# Patient Record
Sex: FEMALE | Race: WHITE | NOT HISPANIC OR LATINO | Employment: OTHER | URBAN - METROPOLITAN AREA
[De-identification: names, ages, dates, MRNs, and addresses within clinical notes are randomized per-mention and may not be internally consistent; named-entity substitution may affect disease eponyms.]

---

## 2018-04-13 LAB
ANION GAP SERPL CALCULATED.3IONS-SCNC: 13 MM/L
APTT PPP: 30.7 SEC (ref 24.4–37.6)
BASOPHILS # BLD AUTO: 0.1 X3/UL (ref 0–0.3)
BASOPHILS # BLD AUTO: 0.7 % (ref 0–2)
BUN SERPL-MCNC: 15 MG/DL (ref 7–25)
CALCIUM SERPL-MCNC: 9.3 MG/DL (ref 8.6–10.5)
CHLORIDE SERPL-SCNC: 105 MM/L (ref 98–107)
CO2 SERPL-SCNC: 27 MM/L (ref 21–31)
CREAT SERPL-MCNC: 0.82 MG/DL (ref 0.6–1.2)
DEPRECATED RDW RBC AUTO: 13.5 % (ref 11.5–14.5)
EGFR (HISTORICAL): > 60 GFR
EGFR AFRICAN AMERICAN (HISTORICAL): > 60 GFR
EOSINOPHIL # BLD AUTO: 0.4 X3/UL (ref 0–0.5)
EOSINOPHIL NFR BLD AUTO: 5.3 % (ref 0–5)
GLUCOSE (HISTORICAL): 112 MG/DL (ref 65–99)
HCT VFR BLD AUTO: 41.2 % (ref 37–47)
HGB BLD-MCNC: 14 G/DL (ref 12–16)
INR PPP: 1.05 (ref 0.9–1.5)
LYMPHOCYTES # BLD AUTO: 2.2 X3/UL (ref 1.2–4.2)
LYMPHOCYTES NFR BLD AUTO: 28.5 % (ref 20.5–51.1)
MCH RBC QN AUTO: 29.1 PG (ref 26–34)
MCHC RBC AUTO-ENTMCNC: 34.1 G/DL (ref 31–36)
MCV RBC AUTO: 85.3 FL (ref 81–99)
MONOCYTES # BLD AUTO: 0.6 X3/UL (ref 0–1)
MONOCYTES NFR BLD AUTO: 7.6 % (ref 1.7–12)
NEUTROPHILS # BLD AUTO: 4.4 X3/UL (ref 1.4–6.5)
NEUTS SEG NFR BLD AUTO: 57.9 % (ref 42.2–75.2)
OSMOLALITY, SERUM (HISTORICAL): 283 MOSM (ref 262–291)
PLATELET # BLD AUTO: 225 X3/UL (ref 130–400)
PMV BLD AUTO: 9.3 FL (ref 8.6–11.7)
POTASSIUM SERPL-SCNC: 4 MM/L (ref 3.5–5.5)
PROTHROMBIN TIME (HISTORICAL): 12.2 SEC (ref 10.1–12.9)
RBC # BLD AUTO: 4.82 X6/UL (ref 3.9–5.2)
SODIUM SERPL-SCNC: 141 MM/L (ref 134–143)
WBC # BLD AUTO: 7.7 X3/UL (ref 4.8–10.8)

## 2018-04-18 LAB
ALBUMIN SERPL BCP-MCNC: 4.2 G/DL (ref 3.5–5.7)
ALP SERPL-CCNC: 71 IU/L (ref 55–165)
ALT SERPL W P-5'-P-CCNC: 11 IU/L (ref 10–30)
ANION GAP SERPL CALCULATED.3IONS-SCNC: 11.8 MM/L
AST SERPL W P-5'-P-CCNC: 14 U/L (ref 7–26)
BILIRUB SERPL-MCNC: 0.5 MG/DL (ref 0.3–1)
BUN SERPL-MCNC: 16 MG/DL (ref 7–25)
CALCIUM SERPL-MCNC: 9.1 MG/DL (ref 8.6–10.5)
CHLORIDE SERPL-SCNC: 105 MM/L (ref 98–107)
CO2 SERPL-SCNC: 27 MM/L (ref 21–31)
CREAT SERPL-MCNC: 0.91 MG/DL (ref 0.6–1.2)
EGFR (HISTORICAL): > 60 GFR
EGFR AFRICAN AMERICAN (HISTORICAL): > 60 GFR
GLUCOSE (HISTORICAL): 111 MG/DL (ref 65–99)
LDL CHOLESTEROL DIRECT (HISTORICAL): 128.6 MG/DL
OSMOLALITY, SERUM (HISTORICAL): 281 MOSM (ref 262–291)
POTASSIUM SERPL-SCNC: 3.8 MM/L (ref 3.5–5.5)
SODIUM SERPL-SCNC: 140 MM/L (ref 134–143)
TOTAL PROTEIN (HISTORICAL): 7.1 G/DL (ref 6.4–8.9)

## 2018-04-23 ENCOUNTER — ANESTHESIA EVENT (OUTPATIENT)
Dept: PERIOP | Facility: HOSPITAL | Age: 63
End: 2018-04-23
Payer: COMMERCIAL

## 2018-04-24 ENCOUNTER — HOSPITAL ENCOUNTER (OUTPATIENT)
Facility: HOSPITAL | Age: 63
Setting detail: OUTPATIENT SURGERY
Discharge: HOME | End: 2018-04-24
Attending: OTOLARYNGOLOGY | Admitting: OTOLARYNGOLOGY
Payer: COMMERCIAL

## 2018-04-24 ENCOUNTER — ANESTHESIA (OUTPATIENT)
Dept: PERIOP | Facility: HOSPITAL | Age: 63
End: 2018-04-24
Payer: COMMERCIAL

## 2018-04-24 VITALS
SYSTOLIC BLOOD PRESSURE: 142 MMHG | HEIGHT: 64 IN | OXYGEN SATURATION: 96 % | BODY MASS INDEX: 30.05 KG/M2 | HEART RATE: 84 BPM | WEIGHT: 176 LBS | DIASTOLIC BLOOD PRESSURE: 81 MMHG | RESPIRATION RATE: 18 BRPM | TEMPERATURE: 98.3 F

## 2018-04-24 DIAGNOSIS — D37.01 NEOPLASM OF UNCERTAIN BEHAVIOR OF LIP, ORAL CAVITY AND PHARYNX: Primary | ICD-10-CM

## 2018-04-24 DIAGNOSIS — D37.09 NEOPLASM OF UNCERTAIN BEHAVIOR OF LIP, ORAL CAVITY AND PHARYNX: Primary | ICD-10-CM

## 2018-04-24 DIAGNOSIS — D37.05 NEOPLASM OF UNCERTAIN BEHAVIOR OF LIP, ORAL CAVITY AND PHARYNX: Primary | ICD-10-CM

## 2018-04-24 DIAGNOSIS — D37.05 NEOPLASM OF UNCERTAIN BEHAVIOR OF PHARYNX: ICD-10-CM

## 2018-04-24 PROCEDURE — 88307 TISSUE EXAM BY PATHOLOGIST: CPT | Performed by: PATHOLOGY

## 2018-04-24 RX ORDER — SODIUM CHLORIDE 9 MG/ML
INJECTION, SOLUTION INTRAVENOUS AS NEEDED
Status: DISCONTINUED | OUTPATIENT
Start: 2018-04-24 | End: 2018-04-24 | Stop reason: HOSPADM

## 2018-04-24 RX ORDER — FENTANYL CITRATE/PF 50 MCG/ML
50 SYRINGE (ML) INJECTION
Status: DISCONTINUED | OUTPATIENT
Start: 2018-04-24 | End: 2018-04-24 | Stop reason: HOSPADM

## 2018-04-24 RX ORDER — OXYCODONE HYDROCHLORIDE AND ACETAMINOPHEN 5; 325 MG/1; MG/1
2 TABLET ORAL EVERY 4 HOURS PRN
Status: DISCONTINUED | OUTPATIENT
Start: 2018-04-24 | End: 2018-04-24 | Stop reason: HOSPADM

## 2018-04-24 RX ORDER — PROPOFOL 10 MG/ML
INJECTION, EMULSION INTRAVENOUS AS NEEDED
Status: DISCONTINUED | OUTPATIENT
Start: 2018-04-24 | End: 2018-04-24 | Stop reason: SURG

## 2018-04-24 RX ORDER — ONDANSETRON 2 MG/ML
4 INJECTION INTRAMUSCULAR; INTRAVENOUS EVERY 6 HOURS PRN
Status: DISCONTINUED | OUTPATIENT
Start: 2018-04-24 | End: 2018-04-24 | Stop reason: HOSPADM

## 2018-04-24 RX ORDER — ONDANSETRON 2 MG/ML
4 INJECTION INTRAMUSCULAR; INTRAVENOUS ONCE AS NEEDED
Status: DISCONTINUED | OUTPATIENT
Start: 2018-04-24 | End: 2018-04-24 | Stop reason: HOSPADM

## 2018-04-24 RX ORDER — FENTANYL CITRATE 50 UG/ML
INJECTION, SOLUTION INTRAMUSCULAR; INTRAVENOUS AS NEEDED
Status: DISCONTINUED | OUTPATIENT
Start: 2018-04-24 | End: 2018-04-24 | Stop reason: SURG

## 2018-04-24 RX ORDER — ROCURONIUM BROMIDE 10 MG/ML
INJECTION, SOLUTION INTRAVENOUS AS NEEDED
Status: DISCONTINUED | OUTPATIENT
Start: 2018-04-24 | End: 2018-04-24 | Stop reason: SURG

## 2018-04-24 RX ORDER — ACETAMINOPHEN AND CODEINE PHOSPHATE 300; 30 MG/1; MG/1
TABLET ORAL
Refills: 0
Start: 2018-04-24

## 2018-04-24 RX ORDER — MIDAZOLAM HYDROCHLORIDE 1 MG/ML
INJECTION INTRAMUSCULAR; INTRAVENOUS AS NEEDED
Status: DISCONTINUED | OUTPATIENT
Start: 2018-04-24 | End: 2018-04-24 | Stop reason: SURG

## 2018-04-24 RX ORDER — SODIUM CHLORIDE 9 MG/ML
125 INJECTION, SOLUTION INTRAVENOUS CONTINUOUS
Status: DISCONTINUED | OUTPATIENT
Start: 2018-04-24 | End: 2018-04-24 | Stop reason: HOSPADM

## 2018-04-24 RX ORDER — MAGNESIUM HYDROXIDE 1200 MG/15ML
LIQUID ORAL AS NEEDED
Status: DISCONTINUED | OUTPATIENT
Start: 2018-04-24 | End: 2018-04-24 | Stop reason: HOSPADM

## 2018-04-24 RX ADMIN — FENTANYL CITRATE 50 MCG: 50 INJECTION, SOLUTION INTRAMUSCULAR; INTRAVENOUS at 11:39

## 2018-04-24 RX ADMIN — FENTANYL CITRATE 100 MCG: 50 INJECTION, SOLUTION INTRAMUSCULAR; INTRAVENOUS at 11:29

## 2018-04-24 RX ADMIN — ROCURONIUM BROMIDE 30 MG: 10 INJECTION INTRAVENOUS at 11:29

## 2018-04-24 RX ADMIN — FENTANYL CITRATE 50 MCG: 50 INJECTION, SOLUTION INTRAMUSCULAR; INTRAVENOUS at 11:46

## 2018-04-24 RX ADMIN — PROPOFOL 200 MG: 10 INJECTION, EMULSION INTRAVENOUS at 11:29

## 2018-04-24 RX ADMIN — ONDANSETRON HYDROCHLORIDE 4 MG: 2 INJECTION, SOLUTION INTRAVENOUS at 11:29

## 2018-04-24 RX ADMIN — DEXAMETHASONE SODIUM PHOSPHATE 4 MG: 10 INJECTION INTRAMUSCULAR; INTRAVENOUS at 11:29

## 2018-04-24 RX ADMIN — SODIUM CHLORIDE 125 ML/HR: 0.9 INJECTION, SOLUTION INTRAVENOUS at 11:00

## 2018-04-24 RX ADMIN — MIDAZOLAM HYDROCHLORIDE 2 MG: 1 INJECTION, SOLUTION INTRAMUSCULAR; INTRAVENOUS at 11:20

## 2018-04-24 NOTE — ANESTHESIA PREPROCEDURE EVALUATION
Review of Systems/Medical History  Patient summary reviewed  Chart reviewed  No history of anesthetic complications     Cardiovascular  Hyperlipidemia,    Pulmonary       GI/Hepatic      Comment: IBS          Endo/Other     GYN       Hematology   Musculoskeletal    Arthritis     Neurology   Psychology           Physical Exam    Airway    Mallampati score: II  TM Distance: >3 FB  Neck ROM: full     Dental   No notable dental hx     Cardiovascular  Rhythm: regular, Rate: normal, Cardiovascular exam normal    Pulmonary  Pulmonary exam normal     Other Findings        Anesthesia Plan  ASA Score- 2     Anesthesia Type- general with ASA Monitors  Additional Monitors:   Airway Plan:         Plan Factors- Patient instructed to abstain from smoking on day of procedure  Patient did not smoke on day of surgery  Induction- intravenous  Postoperative Plan-     Informed Consent- Anesthetic plan and risks discussed with patient

## 2018-04-24 NOTE — DISCHARGE INSTRUCTIONS
ORL instructions    1  Advance diet slowly  Start with liquids and then advance to soft and solids as tolerated  2   Take pain medication as needed  Prescription given in the office  3  Ear pain is expected  Throat pain as expected  4   You may notice a very small amount of blood draining down the back of your throat or coming out the front of your nose  5   Use Afrin nasal spray for any significant nasal congestion  You may also use salt water rinses  In her nose  6  Call Dr Jennifer Naylor at [phone] for any fever greater than 101, significant difficulty with swallowing, or pain unrelieved with medication  7   The office will call tomorrow to confirm postoperative appointment    Acetaminophen/Codeine (By mouth)   Acetaminophen (y-jlqa-q-MIN-oh-fen), Codeine Phosphate (KOE-kristina FOS-fate)  Treats mild to moderately severe pain  This medicine contains a narcotic pain reliever  Brand Name(s): [employer] w/Codeine, Tylenol With Codeine No  4, Tylenol w/Codeine #3, Tylenol with Codeine No  3   There may be other brand names for this medicine  When This Medicine Should Not Be Used: This medicine is not right for everyone  Do not use it if you had an allergic reaction to acetaminophen or codeine, or to other narcotic medicines, or if you have stomach or bowel blockage (including paralytic ileus)  How to Use This Medicine:   Capsule, Liquid, Tablet  · Your doctor will tell you how much medicine to use  Do not use more than directed  · You may take this medicine with food or milk if it upsets your stomach  · Measure the oral liquid medicine with a marked measuring spoon, oral syringe, or medicine cup  · Drink plenty of liquids to help avoid constipation  · This medicine should come with a Medication Guide  Ask your pharmacist for a copy if you do not have one  · Missed dose: Take a dose as soon as you remember  If it is almost time for your next dose, wait until then and take a regular dose   Do not take extra medicine to make up for a missed dose  · Store the medicine in a closed container at room temperature, away from heat, moisture, and direct light  Keep the oral liquid in the refrigerator  Do not freeze  Drugs and Foods to Avoid:   Ask your doctor or pharmacist before using any other medicine, including over-the-counter medicines, vitamins, and herbal products  · Do not use this medicine if you are using or have used an MAO inhibitor within the past 14 days  · Some medicines can affect how this medicine works  Tell your doctor if you are using any of the following:   ¨ Amiodarone, carbamazepine, erythromycin, ketoconazole, mirtazapine, phenytoin, quinidine, rifampin, ritonavir, tramadol, trazodone  ¨ Diuretic (water pill)  ¨ Medicine to treat depression (including bupropion, fluoxetine, paroxetine, quinidine)  ¨ Phenothiazine medicine  ¨ Triptan medicine to treat migraine headaches  · Tell your doctor if you use anything else that makes you sleepy  Some examples are allergy medicine, narcotic pain medicine, and alcohol  Tell your doctor if you are using buprenorphine, butorphanol, nalbuphine, pentazocine, or a muscle relaxer  · Do not drink alcohol while you are using this medicine  Acetaminophen can damage your liver, and alcohol can increase this risk  Do not take acetaminophen without asking your doctor if you have 3 or more drinks of alcohol every day  Warnings While Using This Medicine:   · Tell your doctor if you are pregnant or breastfeeding, or if you have kidney disease, liver disease, adrenal problems (such as [place] disease), asthma, or breathing problems (such as respiratory depression, sleep apnea)  Tell your doctor if you have an enlarged prostate, trouble urinating, stomach problems, an underactive thyroid, or a history of head injury or brain damage  Tell your doctor if you had an allergic reaction to sulfites or if you have a history of drug or alcohol abuse    · This medicine may cause the following problems:  ¨ High risk of overdose, which can lead to death  ¨ Respiratory depression (serious breathing problem that can be life-threatening)  ¨ Serotonin syndrome (when used with certain medicines)  ¨ Serious skin reactions  · This medicine can be habit-forming  Do not use more than your prescribed dose  Call your doctor if you think your medicine is not working  · This medicine contains acetaminophen  Read the labels of all other medicines you are using to see if they also contain acetaminophen, or ask your doctor or pharmacist  Nadine Torres not use more than 4 grams (4,000 milligrams) total of acetaminophen in one day  · Do not stop using this medicine suddenly  Your doctor will need to slowly decrease your dose before you stop it completely  · This medicine may make you dizzy or drowsy  Do not drive or do anything that could be dangerous until you know how this medicine affects you  Sit or lie down if you feel dizzy  Stand up carefully  · Tell any doctor or dentist who treats you that you are using this medicine  This medicine may affect certain medical test results  · This medicine could cause infertility  Talk with your doctor before using this medicine if you plan to have children  · This medicine may cause constipation, especially with long-term use  Ask your doctor if you should use a laxative to prevent and treat constipation  · Keep all medicine out of the reach of children  Never share your medicine with anyone    Possible Side Effects While Using This Medicine:   Call your doctor right away if you notice any of these side effects:  · Allergic reaction: Itching or hives, swelling in your face or hands, swelling or tingling in your mouth or throat, chest tightness, trouble breathing  · Anxiety, restlessness, fast heartbeat, fever, sweating, muscle spasms, twitching, diarrhea, seeing or hearing things that are not there  · Dark urine or pale stools, nausea, vomiting, loss of appetite, stomach pain, yellow skin or eyes  · Extreme drowsiness or confusion  · Lightheadedness, dizziness, or fainting  · Seizures  · Trouble breathing, shallow breathing, blue lips or nails  · Unusual bleeding, bruising, or weakness  If you notice these less serious side effects, talk with your doctor:   · Mild dizziness or drowsiness  · Mild nausea or vomiting, constipation  If you notice other side effects that you think are caused by this medicine, tell your doctor  Call your doctor for medical advice about side effects  You may report side effects to FDA at 9-175-FDA-0603  © 2017 [employer] Information is for End User's use only and may not be sold, redistributed or otherwise used for commercial purposes  The above information is an  only  It is not intended as medical advice for individual conditions or treatments  Talk to your doctor, nurse or pharmacist before following any medical regimen to see if it is safe and effective for you

## 2018-04-24 NOTE — OP NOTE
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]    Procedure(s) (LRB):  NASOPHARYNGEAL MASS EXCISION (N/A)    Specimen(s):  ID Type Source Tests Collected by Time Destination   1 : NASOPHARYNGEAL MASS  Tissue Soft Tissue, Other TISSUE EXAM Haseeb Harris DO 2018 1144        Estimated Blood Loss:   Less than 50ml    Drains:       Anesthesia Type:   General    Operative Indications:  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]      Operative Findings:  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     Complications:   None    Procedure and Technique:  Patient was identified in the holding area  She was taken to the operating room placed on the OR table in supine position  She was placed under general anesthesia  Table turned 90° and she was placed with head in slight extension  Formal time-out was obtained and all information was noted to be correct  McIvor mouth gag was inserted and used to open the oral cavity keeping the endotracheal tube down along the tongue base  This was held in place with De Luna stand suspension  I attempted to use a red rubber catheter and placed through the right nasal cavity but this caused bleeding and was unable to pass  I was then able to pass this through the left side and rested in the oral cavity with the shin and forcep  This was clamped outside the oral cavity and used to elevate the soft palate during the entire case  Mirror guidance was used to evaluate the large nasopharyngeal mass  Using a [employer] forcep I removed the bulk of this tissue and this was sent for specimen which was permanent    I then tried to use the Coblator mely to remove the remainder of the tissue  Unfortunately the Coblator mely clogged during the case and had to open up the suction Bovie  The remainder of this was cauterized and coagulated with the suction Bovie  At the completion there was excellent visualization into the nasopharynx and up into the choana  There was no longer any obstruction  There was no bleeding  Soft suction was used to remove any gastric contents  At this point the table was turned back to normal position after the mouth gag was removed  She was then awoken, extubated, taken to recovery in stable condition     I was present for the entire procedure    Patient Disposition:  PACU     SIGNATURE: Heather Torres DO  DATE: April 24, 2018  TIME: 12:08 PM

## 2018-07-17 ENCOUNTER — TRANSCRIBE ORDERS (OUTPATIENT)
Dept: ADMINISTRATIVE | Facility: HOSPITAL | Age: 63
End: 2018-07-17

## 2018-07-17 ENCOUNTER — APPOINTMENT (OUTPATIENT)
Dept: LAB | Facility: HOSPITAL | Age: 63
End: 2018-07-17

## 2018-07-17 DIAGNOSIS — Z00.8 HEALTH EXAMINATION IN POPULATION SURVEY: ICD-10-CM

## 2018-07-17 DIAGNOSIS — Z00.8 HEALTH EXAMINATION IN POPULATION SURVEY: Primary | ICD-10-CM

## 2018-07-17 LAB
CHOLEST SERPL-MCNC: 198 MG/DL (ref 0–200)
EST. AVERAGE GLUCOSE BLD GHB EST-MCNC: 117 MG/DL
HBA1C MFR BLD: 5.7 % (ref 4.2–6.3)
HDLC SERPL-MCNC: 53 MG/DL (ref 40–60)
LDLC SERPL CALC-MCNC: 106 MG/DL (ref 75–193)
NONHDLC SERPL-MCNC: 145 MG/DL
TRIGL SERPL-MCNC: 193 MG/DL (ref 44–166)

## 2018-07-17 PROCEDURE — 83036 HEMOGLOBIN GLYCOSYLATED A1C: CPT

## 2018-07-17 PROCEDURE — 36415 COLL VENOUS BLD VENIPUNCTURE: CPT

## 2018-07-17 PROCEDURE — 80061 LIPID PANEL: CPT

## 2019-11-21 ENCOUNTER — OFFICE VISIT (OUTPATIENT)
Dept: URGENT CARE | Facility: CLINIC | Age: 64
End: 2019-11-21
Payer: COMMERCIAL

## 2019-11-21 ENCOUNTER — APPOINTMENT (OUTPATIENT)
Dept: RADIOLOGY | Facility: CLINIC | Age: 64
End: 2019-11-21
Payer: COMMERCIAL

## 2019-11-21 VITALS
DIASTOLIC BLOOD PRESSURE: 60 MMHG | SYSTOLIC BLOOD PRESSURE: 108 MMHG | HEART RATE: 87 BPM | TEMPERATURE: 98 F | BODY MASS INDEX: 31.32 KG/M2 | WEIGHT: 188 LBS | RESPIRATION RATE: 20 BRPM | OXYGEN SATURATION: 99 % | HEIGHT: 65 IN

## 2019-11-21 DIAGNOSIS — B35.1 TOENAIL FUNGUS: Primary | ICD-10-CM

## 2019-11-21 DIAGNOSIS — B35.1 TOENAIL FUNGUS: ICD-10-CM

## 2019-11-21 PROCEDURE — 73630 X-RAY EXAM OF FOOT: CPT

## 2019-11-21 PROCEDURE — 99203 OFFICE O/P NEW LOW 30 MIN: CPT | Performed by: EMERGENCY MEDICINE

## 2019-11-21 PROCEDURE — S9088 SERVICES PROVIDED IN URGENT: HCPCS | Performed by: EMERGENCY MEDICINE

## 2019-11-21 NOTE — PATIENT INSTRUCTIONS
You may have a fungus in your toenail that requires testing before treatment  An appointment with dermatology will be helpful

## 2019-11-21 NOTE — PROGRESS NOTES
[facility]        NAME: Ambrose Mcknight is a 62 y o  female  : 1955    MRN: [id number]  DATE: 2019  TIME: 8:46 AM    Assessment and Plan   Toenail fungus [B35 1]  1  Toenail fungus  Ambulatory referral to Dermatology    CANCELED: XR foot 2 vw left   xray neg for fracture  DJD noted  Referral to derm      Patient Instructions     Patient Instructions   You may have a fungus in your toenail that requires testing before treatment  An appointment with dermatology will be helpful  Follow up with PCP in 3-5 days  Proceed to  ER if symptoms worsen  Chief Complaint     Chief Complaint   Patient presents with    Foot Pain     left great toenail is black for 1 month, denies injury         History of Present Illness       This is a 62-year-old female who presents with approximately 1 month of toenail discoloration of the great toe on her left foot  She denies any trauma  Patient is not a diabetic  She is not on blood thinners  She is not immune compromised  Review of Systems   Review of Systems   Constitutional: Negative for fever  HENT: Negative for congestion  Musculoskeletal: Negative for arthralgias, gait problem and myalgias  Toenail discoloration of the left great toe           Current Medications       Current Outpatient Medications:     acetaminophen-codeine (TYLENOL #3) 300-30 mg per tablet, Take as directed at home (Patient not taking: Reported on 2019), Disp: , Rfl: 0    Naproxen Sodium (ALEVE PO), Take 1 tablet by mouth 2 (two) times a day as needed, Disp: , Rfl:     Probiotic Product (PROBIOTIC DAILY PO), Take 1 capsule by mouth daily, Disp: , Rfl:     Current Allergies     Allergies as of 2019    (No Known Allergies)            The following portions of the patient's history were reviewed and updated as appropriate: allergies, current medications, past family history, past medical history, past social history, past surgical history and problem list      Past Medical History:   Diagnosis Date    Arthritis     Cataract     melissa    Hyperlipidemia     Irritable bowel syndrome     Nasopharyngeal mass     Wears glasses     reading       Past Surgical History:   Procedure Laterality Date    COLONOSCOPY      NJ EXCISE PHARYNX LESION N/A 4/24/2018    Procedure: NASOPHARYNGEAL MASS EXCISION;  Surgeon: DO KIM;  Location: [facility];  Service: ENT    WISDOM TOOTH EXTRACTION         History reviewed  No pertinent family history  Medications have been verified  Objective   /60   Pulse 87   Temp 98 °F (36 7 °C) (Tympanic)   Resp 20   Ht 5' 5" (1 651 m)   Wt 85 3 kg (188 lb)   SpO2 99%   BMI 31 28 kg/m²        Physical Exam     Physical Exam   Constitutional: She is oriented to person, place, and time  She appears well-developed and well-nourished  HENT:   Head: Normocephalic and atraumatic  Eyes: Pupils are equal, round, and reactive to light  Conjunctivae and EOM are normal    Neck: Normal range of motion  Neck supple  Cardiovascular: Normal rate  Pulmonary/Chest: Effort normal    Musculoskeletal: Normal range of motion  There is minimal darkening of the toenail of the left great toe there is no thickening of the nail there is no pitting of the nail noted  There is no ridging noted   Neurological: She is alert and oriented to person, place, and time  Skin: Skin is warm and dry  Psychiatric: She has a normal mood and affect  Nursing note and vitals reviewed

## 2020-05-22 ENCOUNTER — TRANSCRIBE ORDERS (OUTPATIENT)
Dept: LAB | Facility: HOSPITAL | Age: 65
End: 2020-05-22

## 2020-05-22 ENCOUNTER — APPOINTMENT (OUTPATIENT)
Dept: LAB | Facility: HOSPITAL | Age: 65
End: 2020-05-22
Payer: COMMERCIAL

## 2020-05-22 DIAGNOSIS — E78.5 HYPERLIPIDEMIA, UNSPECIFIED HYPERLIPIDEMIA TYPE: ICD-10-CM

## 2020-05-22 DIAGNOSIS — R42 DIZZINESS AND GIDDINESS: ICD-10-CM

## 2020-05-22 DIAGNOSIS — R53.83 FATIGUE, UNSPECIFIED TYPE: ICD-10-CM

## 2020-05-22 DIAGNOSIS — R42 DIZZINESS AND GIDDINESS: Primary | ICD-10-CM

## 2020-05-22 DIAGNOSIS — R73.01 IMPAIRED FASTING GLUCOSE: ICD-10-CM

## 2020-05-22 LAB
ALBUMIN SERPL BCP-MCNC: 4.2 G/DL (ref 3.5–5.7)
ALP SERPL-CCNC: 63 U/L (ref 55–165)
ALT SERPL W P-5'-P-CCNC: 15 U/L (ref 7–52)
ANION GAP SERPL CALCULATED.3IONS-SCNC: 8 MMOL/L (ref 4–13)
AST SERPL W P-5'-P-CCNC: 17 U/L (ref 13–39)
BACTERIA UR QL AUTO: ABNORMAL /HPF
BILIRUB SERPL-MCNC: 0.7 MG/DL (ref 0.2–1)
BILIRUB UR QL STRIP: NEGATIVE
BUN SERPL-MCNC: 13 MG/DL (ref 7–25)
CALCIUM SERPL-MCNC: 9.1 MG/DL (ref 8.6–10.5)
CHLORIDE SERPL-SCNC: 106 MMOL/L (ref 98–107)
CHOLEST SERPL-MCNC: 192 MG/DL (ref 0–200)
CLARITY UR: CLEAR
CO2 SERPL-SCNC: 27 MMOL/L (ref 21–31)
COLOR UR: YELLOW
CREAT SERPL-MCNC: 0.86 MG/DL (ref 0.6–1.2)
ERYTHROCYTE [DISTWIDTH] IN BLOOD BY AUTOMATED COUNT: 13.8 % (ref 11.5–14.5)
EST. AVERAGE GLUCOSE BLD GHB EST-MCNC: 114 MG/DL
GFR SERPL CREATININE-BSD FRML MDRD: 72 ML/MIN/1.73SQ M
GLUCOSE P FAST SERPL-MCNC: 101 MG/DL (ref 65–99)
GLUCOSE UR STRIP-MCNC: NEGATIVE MG/DL
HBA1C MFR BLD: 5.6 %
HCT VFR BLD AUTO: 41.9 % (ref 42–47)
HDLC SERPL-MCNC: 46 MG/DL
HGB BLD-MCNC: 14 G/DL (ref 12–16)
HGB UR QL STRIP.AUTO: NEGATIVE
KETONES UR STRIP-MCNC: NEGATIVE MG/DL
LDLC SERPL CALC-MCNC: 123 MG/DL (ref 0–100)
LEUKOCYTE ESTERASE UR QL STRIP: ABNORMAL
MCH RBC QN AUTO: 29.4 PG (ref 26–34)
MCHC RBC AUTO-ENTMCNC: 33.3 G/DL (ref 31–37)
MCV RBC AUTO: 88 FL (ref 81–99)
NITRITE UR QL STRIP: NEGATIVE
NON-SQ EPI CELLS URNS QL MICRO: ABNORMAL /HPF
NONHDLC SERPL-MCNC: 146 MG/DL
PH UR STRIP.AUTO: 5.5 [PH]
PLATELET # BLD AUTO: 236 THOUSANDS/UL (ref 149–390)
PMV BLD AUTO: 8.9 FL (ref 8.6–11.7)
POTASSIUM SERPL-SCNC: 3.8 MMOL/L (ref 3.5–5.5)
PROT SERPL-MCNC: 7.4 G/DL (ref 6.4–8.9)
PROT UR STRIP-MCNC: NEGATIVE MG/DL
RBC # BLD AUTO: 4.75 MILLION/UL (ref 3.9–5.2)
RBC #/AREA URNS AUTO: ABNORMAL /HPF
SODIUM SERPL-SCNC: 141 MMOL/L (ref 134–143)
SP GR UR STRIP.AUTO: >=1.03 (ref 1–1.03)
TRIGL SERPL-MCNC: 113 MG/DL (ref 44–166)
TSH SERPL DL<=0.05 MIU/L-ACNC: 2.03 UIU/ML (ref 0.45–5.33)
UROBILINOGEN UR QL STRIP.AUTO: 0.2 E.U./DL
WBC # BLD AUTO: 8.2 THOUSAND/UL (ref 4.8–10.8)
WBC #/AREA URNS AUTO: ABNORMAL /HPF

## 2020-05-22 PROCEDURE — 80053 COMPREHEN METABOLIC PANEL: CPT

## 2020-05-22 PROCEDURE — 85027 COMPLETE CBC AUTOMATED: CPT

## 2020-05-22 PROCEDURE — 81003 URINALYSIS AUTO W/O SCOPE: CPT

## 2020-05-22 PROCEDURE — 84443 ASSAY THYROID STIM HORMONE: CPT

## 2020-05-22 PROCEDURE — 81001 URINALYSIS AUTO W/SCOPE: CPT

## 2020-05-22 PROCEDURE — 36415 COLL VENOUS BLD VENIPUNCTURE: CPT

## 2020-05-22 PROCEDURE — 80061 LIPID PANEL: CPT

## 2020-05-22 PROCEDURE — 83036 HEMOGLOBIN GLYCOSYLATED A1C: CPT

## 2020-05-29 ENCOUNTER — TRANSCRIBE ORDERS (OUTPATIENT)
Dept: ADMINISTRATIVE | Facility: HOSPITAL | Age: 65
End: 2020-05-29

## 2020-05-29 DIAGNOSIS — Z12.31 VISIT FOR SCREENING MAMMOGRAM: Primary | ICD-10-CM

## 2020-06-11 ENCOUNTER — HOSPITAL ENCOUNTER (OUTPATIENT)
Dept: MAMMOGRAPHY | Facility: HOSPITAL | Age: 65
Discharge: HOME | End: 2020-06-11
Payer: COMMERCIAL

## 2020-06-11 VITALS — WEIGHT: 188 LBS | HEIGHT: 65 IN | BODY MASS INDEX: 31.32 KG/M2

## 2020-06-11 DIAGNOSIS — Z12.31 VISIT FOR SCREENING MAMMOGRAM: ICD-10-CM

## 2020-06-11 PROCEDURE — 77067 SCR MAMMO BI INCL CAD: CPT

## 2020-06-11 PROCEDURE — 77063 BREAST TOMOSYNTHESIS BI: CPT

## 2021-02-03 ENCOUNTER — TRANSCRIBE ORDERS (OUTPATIENT)
Dept: LAB | Facility: HOSPITAL | Age: 66
End: 2021-02-03

## 2021-02-03 ENCOUNTER — LAB (OUTPATIENT)
Dept: LAB | Facility: HOSPITAL | Age: 66
End: 2021-02-03
Payer: MEDICARE

## 2021-02-03 DIAGNOSIS — E78.5 HYPERLIPIDEMIA, UNSPECIFIED HYPERLIPIDEMIA TYPE: ICD-10-CM

## 2021-02-03 DIAGNOSIS — E78.5 HYPERLIPIDEMIA, UNSPECIFIED HYPERLIPIDEMIA TYPE: Primary | ICD-10-CM

## 2021-02-03 LAB
ALBUMIN SERPL BCP-MCNC: 4 G/DL (ref 3.5–5.7)
ALP SERPL-CCNC: 59 U/L (ref 55–165)
ALT SERPL W P-5'-P-CCNC: 13 U/L (ref 7–52)
ANION GAP SERPL CALCULATED.3IONS-SCNC: 8 MMOL/L (ref 4–13)
AST SERPL W P-5'-P-CCNC: 18 U/L (ref 13–39)
BILIRUB SERPL-MCNC: 0.7 MG/DL (ref 0.2–1)
BUN SERPL-MCNC: 15 MG/DL (ref 7–25)
CALCIUM SERPL-MCNC: 9 MG/DL (ref 8.6–10.5)
CHLORIDE SERPL-SCNC: 105 MMOL/L (ref 98–107)
CO2 SERPL-SCNC: 26 MMOL/L (ref 21–31)
CREAT SERPL-MCNC: 0.83 MG/DL (ref 0.6–1.2)
ERYTHROCYTE [DISTWIDTH] IN BLOOD BY AUTOMATED COUNT: 13.6 % (ref 11.5–14.5)
GFR SERPL CREATININE-BSD FRML MDRD: 75 ML/MIN/1.73SQ M
GLUCOSE P FAST SERPL-MCNC: 102 MG/DL (ref 65–99)
HCT VFR BLD AUTO: 40.1 % (ref 42–47)
HGB BLD-MCNC: 13.6 G/DL (ref 12–16)
LDLC SERPL DIRECT ASSAY-MCNC: 146 MG/DL (ref 0–100)
MCH RBC QN AUTO: 29.7 PG (ref 26–34)
MCHC RBC AUTO-ENTMCNC: 33.9 G/DL (ref 31–37)
MCV RBC AUTO: 88 FL (ref 81–99)
PLATELET # BLD AUTO: 213 THOUSANDS/UL (ref 149–390)
PMV BLD AUTO: 9.1 FL (ref 8.6–11.7)
POTASSIUM SERPL-SCNC: 3.6 MMOL/L (ref 3.5–5.5)
PROT SERPL-MCNC: 6.8 G/DL (ref 6.4–8.9)
RBC # BLD AUTO: 4.58 MILLION/UL (ref 3.9–5.2)
SODIUM SERPL-SCNC: 139 MMOL/L (ref 134–143)
WBC # BLD AUTO: 7 THOUSAND/UL (ref 4.8–10.8)

## 2021-02-03 PROCEDURE — 85027 COMPLETE CBC AUTOMATED: CPT

## 2021-02-03 PROCEDURE — 83721 ASSAY OF BLOOD LIPOPROTEIN: CPT

## 2021-02-03 PROCEDURE — 36415 COLL VENOUS BLD VENIPUNCTURE: CPT

## 2021-02-03 PROCEDURE — 80053 COMPREHEN METABOLIC PANEL: CPT

## 2021-02-05 ENCOUNTER — TRANSCRIBE ORDERS (OUTPATIENT)
Dept: ADMINISTRATIVE | Facility: HOSPITAL | Age: 66
End: 2021-02-05

## 2021-02-05 DIAGNOSIS — Z78.0 POSTMENOPAUSAL: Primary | ICD-10-CM

## 2021-02-11 ENCOUNTER — HOSPITAL ENCOUNTER (OUTPATIENT)
Dept: RADIOLOGY | Facility: HOSPITAL | Age: 66
Discharge: HOME | End: 2021-02-11
Payer: MEDICARE

## 2021-02-11 ENCOUNTER — TRANSCRIBE ORDERS (OUTPATIENT)
Dept: ADMINISTRATIVE | Facility: HOSPITAL | Age: 66
End: 2021-02-11

## 2021-02-11 ENCOUNTER — HOSPITAL ENCOUNTER (OUTPATIENT)
Dept: BONE DENSITY | Facility: HOSPITAL | Age: 66
Discharge: HOME | End: 2021-02-11
Payer: MEDICARE

## 2021-02-11 DIAGNOSIS — M25.561 RIGHT KNEE PAIN, UNSPECIFIED CHRONICITY: ICD-10-CM

## 2021-02-11 DIAGNOSIS — M25.561 RIGHT KNEE PAIN, UNSPECIFIED CHRONICITY: Primary | ICD-10-CM

## 2021-02-11 DIAGNOSIS — Z78.0 POSTMENOPAUSAL: ICD-10-CM

## 2021-02-11 PROCEDURE — 77080 DXA BONE DENSITY AXIAL: CPT

## 2021-02-11 PROCEDURE — 73562 X-RAY EXAM OF KNEE 3: CPT

## 2021-02-20 ENCOUNTER — LAB (OUTPATIENT)
Dept: LAB | Facility: HOSPITAL | Age: 66
End: 2021-02-20
Attending: INTERNAL MEDICINE
Payer: MEDICARE

## 2021-02-20 DIAGNOSIS — M25.561 RIGHT KNEE PAIN, UNSPECIFIED CHRONICITY: ICD-10-CM

## 2021-02-20 LAB
25(OH)D3 SERPL-MCNC: 16.9 NG/ML (ref 30–100)
ALBUMIN SERPL BCP-MCNC: 3.9 G/DL (ref 3.5–5.7)
ALBUMIN SERPL BCP-MCNC: 3.9 G/DL (ref 3.5–5.7)
ALP SERPL-CCNC: 64 U/L (ref 55–165)
ALP SERPL-CCNC: 64 U/L (ref 55–165)
ALT SERPL W P-5'-P-CCNC: 16 U/L (ref 7–52)
ALT SERPL W P-5'-P-CCNC: 16 U/L (ref 7–52)
ANION GAP SERPL CALCULATED.3IONS-SCNC: 10 MMOL/L (ref 4–13)
ANION GAP SERPL CALCULATED.3IONS-SCNC: 10 MMOL/L (ref 4–13)
AST SERPL W P-5'-P-CCNC: 15 U/L (ref 13–39)
AST SERPL W P-5'-P-CCNC: 15 U/L (ref 13–39)
BILIRUB DIRECT SERPL-MCNC: 0.1 MG/DL (ref 0–0.2)
BILIRUB SERPL-MCNC: 0.7 MG/DL (ref 0.2–1)
BILIRUB SERPL-MCNC: 0.7 MG/DL (ref 0.2–1)
BUN SERPL-MCNC: 18 MG/DL (ref 7–25)
BUN SERPL-MCNC: 18 MG/DL (ref 7–25)
CALCIUM SERPL-MCNC: 9 MG/DL (ref 8.6–10.5)
CALCIUM SERPL-MCNC: 9 MG/DL (ref 8.6–10.5)
CHLORIDE SERPL-SCNC: 106 MMOL/L (ref 98–107)
CHLORIDE SERPL-SCNC: 106 MMOL/L (ref 98–107)
CO2 SERPL-SCNC: 25 MMOL/L (ref 21–31)
CO2 SERPL-SCNC: 25 MMOL/L (ref 21–31)
CREAT SERPL-MCNC: 0.85 MG/DL (ref 0.6–1.2)
CREAT SERPL-MCNC: 0.85 MG/DL (ref 0.6–1.2)
GFR SERPL CREATININE-BSD FRML MDRD: 73 ML/MIN/1.73SQ M
GFR SERPL CREATININE-BSD FRML MDRD: 73 ML/MIN/1.73SQ M
GLUCOSE P FAST SERPL-MCNC: 112 MG/DL (ref 65–99)
GLUCOSE P FAST SERPL-MCNC: 112 MG/DL (ref 65–99)
LDH SERPL-CCNC: 154 U/L (ref 84–246)
MAGNESIUM SERPL-MCNC: 1.9 MG/DL (ref 1.9–2.7)
PHOSPHATE SERPL-MCNC: 3.4 MG/DL (ref 3–5.5)
POTASSIUM SERPL-SCNC: 3.9 MMOL/L (ref 3.5–5.5)
POTASSIUM SERPL-SCNC: 3.9 MMOL/L (ref 3.5–5.5)
PROT SERPL-MCNC: 7.1 G/DL (ref 6.4–8.9)
PROT SERPL-MCNC: 7.3 G/DL (ref 6.4–8.9)
PTH-INTACT SERPL-MCNC: 35.8 PG/ML (ref 18.4–80.1)
SODIUM SERPL-SCNC: 141 MMOL/L (ref 134–143)
SODIUM SERPL-SCNC: 141 MMOL/L (ref 134–143)
TRIGL SERPL-MCNC: 117 MG/DL (ref 44–166)
TSH SERPL DL<=0.05 MIU/L-ACNC: 2.38 UIU/ML (ref 0.45–5.33)

## 2021-02-20 PROCEDURE — 84443 ASSAY THYROID STIM HORMONE: CPT

## 2021-02-20 PROCEDURE — 80076 HEPATIC FUNCTION PANEL: CPT

## 2021-02-20 PROCEDURE — 80048 BASIC METABOLIC PNL TOTAL CA: CPT

## 2021-02-20 PROCEDURE — 84100 ASSAY OF PHOSPHORUS: CPT

## 2021-02-20 PROCEDURE — 36415 COLL VENOUS BLD VENIPUNCTURE: CPT

## 2021-02-20 PROCEDURE — 83615 LACTATE (LD) (LDH) ENZYME: CPT

## 2021-02-20 PROCEDURE — 83970 ASSAY OF PARATHORMONE: CPT

## 2021-02-20 PROCEDURE — 83735 ASSAY OF MAGNESIUM: CPT

## 2021-02-20 PROCEDURE — 80053 COMPREHEN METABOLIC PANEL: CPT

## 2021-02-20 PROCEDURE — 82306 VITAMIN D 25 HYDROXY: CPT

## 2021-02-20 PROCEDURE — 84478 ASSAY OF TRIGLYCERIDES: CPT

## 2021-06-09 ENCOUNTER — APPOINTMENT (OUTPATIENT)
Dept: LAB | Facility: HOSPITAL | Age: 66
End: 2021-06-09
Payer: MEDICARE

## 2021-06-09 ENCOUNTER — TRANSCRIBE ORDERS (OUTPATIENT)
Dept: LAB | Facility: HOSPITAL | Age: 66
End: 2021-06-09

## 2021-06-09 DIAGNOSIS — E78.5 HYPERLIPIDEMIA, UNSPECIFIED HYPERLIPIDEMIA TYPE: ICD-10-CM

## 2021-06-09 DIAGNOSIS — E78.5 HYPERLIPIDEMIA, UNSPECIFIED HYPERLIPIDEMIA TYPE: Primary | ICD-10-CM

## 2021-06-09 LAB
ALBUMIN SERPL BCP-MCNC: 4 G/DL (ref 3.5–5.7)
ALP SERPL-CCNC: 51 U/L (ref 55–165)
ALT SERPL W P-5'-P-CCNC: 13 U/L (ref 7–52)
ANION GAP SERPL CALCULATED.3IONS-SCNC: 8 MMOL/L (ref 4–13)
AST SERPL W P-5'-P-CCNC: 17 U/L (ref 13–39)
BILIRUB SERPL-MCNC: 0.5 MG/DL (ref 0.2–1)
BUN SERPL-MCNC: 18 MG/DL (ref 7–25)
CALCIUM SERPL-MCNC: 8.9 MG/DL (ref 8.6–10.5)
CHLORIDE SERPL-SCNC: 104 MMOL/L (ref 98–107)
CHOLEST SERPL-MCNC: 202 MG/DL (ref 0–200)
CO2 SERPL-SCNC: 28 MMOL/L (ref 21–31)
CREAT SERPL-MCNC: 0.9 MG/DL (ref 0.6–1.2)
ERYTHROCYTE [DISTWIDTH] IN BLOOD BY AUTOMATED COUNT: 13.7 % (ref 11.5–14.5)
GFR SERPL CREATININE-BSD FRML MDRD: 67 ML/MIN/1.73SQ M
GLUCOSE P FAST SERPL-MCNC: 101 MG/DL (ref 65–99)
HCT VFR BLD AUTO: 39.7 % (ref 42–47)
HDLC SERPL-MCNC: 45 MG/DL
HGB BLD-MCNC: 13.4 G/DL (ref 12–16)
LDLC SERPL CALC-MCNC: 137 MG/DL (ref 0–100)
MCH RBC QN AUTO: 29.3 PG (ref 26–34)
MCHC RBC AUTO-ENTMCNC: 33.7 G/DL (ref 31–37)
MCV RBC AUTO: 87 FL (ref 81–99)
NONHDLC SERPL-MCNC: 157 MG/DL
PLATELET # BLD AUTO: 217 THOUSANDS/UL (ref 149–390)
PMV BLD AUTO: 8.7 FL (ref 8.6–11.7)
POTASSIUM SERPL-SCNC: 4 MMOL/L (ref 3.5–5.5)
PROT SERPL-MCNC: 7.1 G/DL (ref 6.4–8.9)
RBC # BLD AUTO: 4.57 MILLION/UL (ref 3.9–5.2)
SODIUM SERPL-SCNC: 140 MMOL/L (ref 134–143)
TRIGL SERPL-MCNC: 102 MG/DL (ref 44–166)
WBC # BLD AUTO: 7 THOUSAND/UL (ref 4.8–10.8)

## 2021-06-09 PROCEDURE — 80061 LIPID PANEL: CPT

## 2021-06-09 PROCEDURE — 80053 COMPREHEN METABOLIC PANEL: CPT

## 2021-06-09 PROCEDURE — 36415 COLL VENOUS BLD VENIPUNCTURE: CPT

## 2021-06-09 PROCEDURE — 85027 COMPLETE CBC AUTOMATED: CPT

## 2021-11-01 ENCOUNTER — HOSPITAL ENCOUNTER (OUTPATIENT)
Dept: MAMMOGRAPHY | Facility: HOSPITAL | Age: 66
Discharge: HOME | End: 2021-11-01
Attending: INTERNAL MEDICINE
Payer: MEDICARE

## 2021-11-01 VITALS — BODY MASS INDEX: 27.49 KG/M2 | HEIGHT: 65 IN | WEIGHT: 165 LBS

## 2021-11-01 DIAGNOSIS — Z12.31 ENCOUNTER FOR SCREENING MAMMOGRAM FOR MALIGNANT NEOPLASM OF BREAST: ICD-10-CM

## 2021-11-01 PROCEDURE — 77067 SCR MAMMO BI INCL CAD: CPT

## 2021-11-01 PROCEDURE — 77063 BREAST TOMOSYNTHESIS BI: CPT

## 2021-12-01 ENCOUNTER — APPOINTMENT (OUTPATIENT)
Dept: LAB | Facility: HOSPITAL | Age: 66
End: 2021-12-01
Payer: MEDICARE

## 2021-12-01 DIAGNOSIS — E78.5 HYPERLIPIDEMIA, UNSPECIFIED HYPERLIPIDEMIA TYPE: ICD-10-CM

## 2021-12-01 DIAGNOSIS — R79.9 ABNORMAL BLOOD CHEMISTRY: ICD-10-CM

## 2021-12-01 LAB
ALBUMIN SERPL BCP-MCNC: 3.7 G/DL (ref 3.5–5)
ALP SERPL-CCNC: 67 U/L (ref 46–116)
ALT SERPL W P-5'-P-CCNC: 23 U/L (ref 12–78)
ANION GAP SERPL CALCULATED.3IONS-SCNC: 7 MMOL/L (ref 4–13)
AST SERPL W P-5'-P-CCNC: 17 U/L (ref 5–45)
BILIRUB SERPL-MCNC: 0.66 MG/DL (ref 0.2–1)
BUN SERPL-MCNC: 15 MG/DL (ref 5–25)
CALCIUM SERPL-MCNC: 9.3 MG/DL (ref 8.3–10.1)
CHLORIDE SERPL-SCNC: 108 MMOL/L (ref 100–108)
CO2 SERPL-SCNC: 25 MMOL/L (ref 21–32)
CREAT SERPL-MCNC: 0.98 MG/DL (ref 0.6–1.3)
ERYTHROCYTE [DISTWIDTH] IN BLOOD BY AUTOMATED COUNT: 13 % (ref 11.6–15.1)
GFR SERPL CREATININE-BSD FRML MDRD: 61 ML/MIN/1.73SQ M
GLUCOSE P FAST SERPL-MCNC: 103 MG/DL (ref 65–99)
HCT VFR BLD AUTO: 42.3 % (ref 34.8–46.1)
HGB BLD-MCNC: 13.7 G/DL (ref 11.5–15.4)
LDLC SERPL DIRECT ASSAY-MCNC: 140 MG/DL (ref 0–100)
MCH RBC QN AUTO: 29 PG (ref 26.8–34.3)
MCHC RBC AUTO-ENTMCNC: 32.4 G/DL (ref 31.4–37.4)
MCV RBC AUTO: 90 FL (ref 82–98)
PLATELET # BLD AUTO: 234 THOUSANDS/UL (ref 149–390)
PMV BLD AUTO: 10.8 FL (ref 8.9–12.7)
POTASSIUM SERPL-SCNC: 4 MMOL/L (ref 3.5–5.3)
PROT SERPL-MCNC: 7.5 G/DL (ref 6.4–8.2)
RBC # BLD AUTO: 4.72 MILLION/UL (ref 3.81–5.12)
SODIUM SERPL-SCNC: 140 MMOL/L (ref 136–145)
WBC # BLD AUTO: 6.86 THOUSAND/UL (ref 4.31–10.16)

## 2021-12-01 PROCEDURE — 36415 COLL VENOUS BLD VENIPUNCTURE: CPT

## 2021-12-01 PROCEDURE — 80053 COMPREHEN METABOLIC PANEL: CPT

## 2021-12-01 PROCEDURE — 85027 COMPLETE CBC AUTOMATED: CPT

## 2021-12-01 PROCEDURE — 83721 ASSAY OF BLOOD LIPOPROTEIN: CPT

## 2022-01-07 ENCOUNTER — APPOINTMENT (OUTPATIENT)
Dept: LAB | Facility: HOSPITAL | Age: 67
End: 2022-01-07
Payer: MEDICARE

## 2022-01-07 DIAGNOSIS — E78.5 HYPERLIPIDEMIA, UNSPECIFIED HYPERLIPIDEMIA TYPE: ICD-10-CM

## 2022-01-07 LAB
ALBUMIN SERPL BCP-MCNC: 3.6 G/DL (ref 3.5–5)
ALP SERPL-CCNC: 60 U/L (ref 46–116)
ALT SERPL W P-5'-P-CCNC: 23 U/L (ref 12–78)
AST SERPL W P-5'-P-CCNC: 12 U/L (ref 5–45)
BILIRUB DIRECT SERPL-MCNC: 0.09 MG/DL (ref 0–0.2)
BILIRUB SERPL-MCNC: 0.69 MG/DL (ref 0.2–1)
LDLC SERPL DIRECT ASSAY-MCNC: 98 MG/DL (ref 0–100)
PROT SERPL-MCNC: 7.3 G/DL (ref 6.4–8.2)

## 2022-01-07 PROCEDURE — 83721 ASSAY OF BLOOD LIPOPROTEIN: CPT

## 2022-01-07 PROCEDURE — 80076 HEPATIC FUNCTION PANEL: CPT

## 2022-01-07 PROCEDURE — 36415 COLL VENOUS BLD VENIPUNCTURE: CPT

## 2022-04-19 ENCOUNTER — HOSPITAL ENCOUNTER (OUTPATIENT)
Dept: ULTRASOUND IMAGING | Facility: HOSPITAL | Age: 67
Discharge: HOME | End: 2022-04-19
Payer: MEDICARE

## 2022-04-19 ENCOUNTER — HOSPITAL ENCOUNTER (OUTPATIENT)
Dept: MAMMOGRAPHY | Facility: HOSPITAL | Age: 67
Discharge: HOME | End: 2022-04-19
Payer: MEDICARE

## 2022-04-19 DIAGNOSIS — N63.10 UNSPECIFIED LUMP IN THE RIGHT BREAST, UNSPECIFIED QUADRANT: ICD-10-CM

## 2022-04-19 PROCEDURE — 77065 DX MAMMO INCL CAD UNI: CPT

## 2022-04-19 PROCEDURE — 76642 ULTRASOUND BREAST LIMITED: CPT

## 2022-04-19 PROCEDURE — G0279 TOMOSYNTHESIS, MAMMO: HCPCS

## 2022-07-11 ENCOUNTER — APPOINTMENT (OUTPATIENT)
Dept: LAB | Facility: CLINIC | Age: 67
End: 2022-07-11
Payer: MEDICARE

## 2022-07-11 DIAGNOSIS — E78.5 HYPERLIPIDEMIA, UNSPECIFIED HYPERLIPIDEMIA TYPE: ICD-10-CM

## 2022-07-11 LAB
ALBUMIN SERPL BCP-MCNC: 3.4 G/DL (ref 3.5–5)
ALP SERPL-CCNC: 62 U/L (ref 46–116)
ALT SERPL W P-5'-P-CCNC: 24 U/L (ref 12–78)
ANION GAP SERPL CALCULATED.3IONS-SCNC: 3 MMOL/L (ref 4–13)
AST SERPL W P-5'-P-CCNC: 17 U/L (ref 5–45)
BILIRUB SERPL-MCNC: 0.53 MG/DL (ref 0.2–1)
BUN SERPL-MCNC: 20 MG/DL (ref 5–25)
CALCIUM ALBUM COR SERPL-MCNC: 9.3 MG/DL (ref 8.3–10.1)
CALCIUM SERPL-MCNC: 8.8 MG/DL (ref 8.3–10.1)
CHLORIDE SERPL-SCNC: 109 MMOL/L (ref 100–108)
CHOLEST SERPL-MCNC: 176 MG/DL
CO2 SERPL-SCNC: 28 MMOL/L (ref 21–32)
CREAT SERPL-MCNC: 0.86 MG/DL (ref 0.6–1.3)
ERYTHROCYTE [DISTWIDTH] IN BLOOD BY AUTOMATED COUNT: 13.2 % (ref 11.6–15.1)
GFR SERPL CREATININE-BSD FRML MDRD: 70 ML/MIN/1.73SQ M
GLUCOSE P FAST SERPL-MCNC: 94 MG/DL (ref 65–99)
HCT VFR BLD AUTO: 40.5 % (ref 34.8–46.1)
HDLC SERPL-MCNC: 55 MG/DL
HGB BLD-MCNC: 13.5 G/DL (ref 11.5–15.4)
LDLC SERPL CALC-MCNC: 101 MG/DL (ref 0–100)
MCH RBC QN AUTO: 29.9 PG (ref 26.8–34.3)
MCHC RBC AUTO-ENTMCNC: 33.3 G/DL (ref 31.4–37.4)
MCV RBC AUTO: 90 FL (ref 82–98)
NONHDLC SERPL-MCNC: 121 MG/DL
PLATELET # BLD AUTO: 232 THOUSANDS/UL (ref 149–390)
PMV BLD AUTO: 10.9 FL (ref 8.9–12.7)
POTASSIUM SERPL-SCNC: 4 MMOL/L (ref 3.5–5.3)
PROT SERPL-MCNC: 7.2 G/DL (ref 6.4–8.2)
RBC # BLD AUTO: 4.52 MILLION/UL (ref 3.81–5.12)
SODIUM SERPL-SCNC: 140 MMOL/L (ref 136–145)
TRIGL SERPL-MCNC: 99 MG/DL
WBC # BLD AUTO: 7.63 THOUSAND/UL (ref 4.31–10.16)

## 2022-07-11 PROCEDURE — 36415 COLL VENOUS BLD VENIPUNCTURE: CPT

## 2022-07-11 PROCEDURE — 80061 LIPID PANEL: CPT

## 2022-07-11 PROCEDURE — 80053 COMPREHEN METABOLIC PANEL: CPT

## 2022-07-11 PROCEDURE — 85027 COMPLETE CBC AUTOMATED: CPT

## 2022-11-22 ENCOUNTER — HOSPITAL ENCOUNTER (OUTPATIENT)
Dept: MAMMOGRAPHY | Facility: HOSPITAL | Age: 67
Discharge: HOME | End: 2022-11-22

## 2022-11-22 VITALS — WEIGHT: 175 LBS | HEIGHT: 65 IN | BODY MASS INDEX: 29.16 KG/M2

## 2022-11-22 DIAGNOSIS — R92.1 BREAST CALCIFICATION, RIGHT: ICD-10-CM

## 2023-01-20 ENCOUNTER — APPOINTMENT (OUTPATIENT)
Dept: LAB | Facility: CLINIC | Age: 68
End: 2023-01-20

## 2023-01-20 DIAGNOSIS — E78.2 MIXED HYPERLIPIDEMIA: ICD-10-CM

## 2023-01-20 LAB
ALBUMIN SERPL BCP-MCNC: 3.5 G/DL (ref 3.5–5)
ALP SERPL-CCNC: 66 U/L (ref 46–116)
ALT SERPL W P-5'-P-CCNC: 21 U/L (ref 12–78)
ANION GAP SERPL CALCULATED.3IONS-SCNC: 4 MMOL/L (ref 4–13)
AST SERPL W P-5'-P-CCNC: 15 U/L (ref 5–45)
BILIRUB SERPL-MCNC: 0.69 MG/DL (ref 0.2–1)
BUN SERPL-MCNC: 17 MG/DL (ref 5–25)
CALCIUM SERPL-MCNC: 8.8 MG/DL (ref 8.3–10.1)
CHLORIDE SERPL-SCNC: 111 MMOL/L (ref 96–108)
CO2 SERPL-SCNC: 27 MMOL/L (ref 21–32)
CREAT SERPL-MCNC: 0.98 MG/DL (ref 0.6–1.3)
GFR SERPL CREATININE-BSD FRML MDRD: 60 ML/MIN/1.73SQ M
GLUCOSE P FAST SERPL-MCNC: 112 MG/DL (ref 65–99)
LDLC SERPL DIRECT ASSAY-MCNC: 104 MG/DL (ref 0–100)
POTASSIUM SERPL-SCNC: 4.2 MMOL/L (ref 3.5–5.3)
PROT SERPL-MCNC: 7.3 G/DL (ref 6.4–8.4)
SODIUM SERPL-SCNC: 142 MMOL/L (ref 135–147)

## 2023-07-18 ENCOUNTER — APPOINTMENT (OUTPATIENT)
Dept: LAB | Facility: CLINIC | Age: 68
End: 2023-07-18
Payer: MEDICARE

## 2023-07-18 DIAGNOSIS — R73.01 IMPAIRED FASTING GLUCOSE: ICD-10-CM

## 2023-07-18 DIAGNOSIS — E78.5 HYPERLIPIDEMIA, UNSPECIFIED HYPERLIPIDEMIA TYPE: ICD-10-CM

## 2023-07-18 LAB
ALBUMIN SERPL BCP-MCNC: 3.6 G/DL (ref 3.5–5)
ALP SERPL-CCNC: 66 U/L (ref 46–116)
ALT SERPL W P-5'-P-CCNC: 18 U/L (ref 12–78)
ANION GAP SERPL CALCULATED.3IONS-SCNC: 2 MMOL/L
AST SERPL W P-5'-P-CCNC: 13 U/L (ref 5–45)
BILIRUB SERPL-MCNC: 0.68 MG/DL (ref 0.2–1)
BUN SERPL-MCNC: 17 MG/DL (ref 5–25)
CALCIUM SERPL-MCNC: 9.3 MG/DL (ref 8.3–10.1)
CHLORIDE SERPL-SCNC: 112 MMOL/L (ref 96–108)
CHOLEST SERPL-MCNC: 214 MG/DL
CO2 SERPL-SCNC: 28 MMOL/L (ref 21–32)
CREAT SERPL-MCNC: 1.04 MG/DL (ref 0.6–1.3)
ERYTHROCYTE [DISTWIDTH] IN BLOOD BY AUTOMATED COUNT: 12.9 % (ref 11.6–15.1)
GFR SERPL CREATININE-BSD FRML MDRD: 55 ML/MIN/1.73SQ M
GLUCOSE P FAST SERPL-MCNC: 107 MG/DL (ref 65–99)
HCT VFR BLD AUTO: 43.1 % (ref 34.8–46.1)
HDLC SERPL-MCNC: 56 MG/DL
HGB BLD-MCNC: 13.7 G/DL (ref 11.5–15.4)
LDLC SERPL CALC-MCNC: 143 MG/DL (ref 0–100)
MCH RBC QN AUTO: 28.9 PG (ref 26.8–34.3)
MCHC RBC AUTO-ENTMCNC: 31.8 G/DL (ref 31.4–37.4)
MCV RBC AUTO: 91 FL (ref 82–98)
NONHDLC SERPL-MCNC: 158 MG/DL
PLATELET # BLD AUTO: 247 THOUSANDS/UL (ref 149–390)
PMV BLD AUTO: 10.9 FL (ref 8.9–12.7)
POTASSIUM SERPL-SCNC: 4.2 MMOL/L (ref 3.5–5.3)
PROT SERPL-MCNC: 7.3 G/DL (ref 6.4–8.4)
RBC # BLD AUTO: 4.74 MILLION/UL (ref 3.81–5.12)
SODIUM SERPL-SCNC: 142 MMOL/L (ref 135–147)
TRIGL SERPL-MCNC: 77 MG/DL
WBC # BLD AUTO: 7.36 THOUSAND/UL (ref 4.31–10.16)

## 2023-07-18 PROCEDURE — 36415 COLL VENOUS BLD VENIPUNCTURE: CPT

## 2023-07-18 PROCEDURE — 80061 LIPID PANEL: CPT

## 2023-07-18 PROCEDURE — 80053 COMPREHEN METABOLIC PANEL: CPT

## 2023-07-18 PROCEDURE — 85027 COMPLETE CBC AUTOMATED: CPT

## 2023-08-01 ENCOUNTER — HOSPITAL ENCOUNTER (OUTPATIENT)
Dept: BONE DENSITY | Facility: HOSPITAL | Age: 68
Discharge: HOME | End: 2023-08-01
Payer: MEDICARE

## 2023-08-01 VITALS — WEIGHT: 174.16 LBS | HEIGHT: 65 IN | BODY MASS INDEX: 29.02 KG/M2

## 2023-08-01 DIAGNOSIS — M85.80 OSTEOPENIA, UNSPECIFIED LOCATION: ICD-10-CM

## 2023-08-01 DIAGNOSIS — Z78.0 MENOPAUSE: ICD-10-CM

## 2023-08-01 PROCEDURE — 77080 DXA BONE DENSITY AXIAL: CPT

## 2023-08-03 ENCOUNTER — HOSPITAL ENCOUNTER (OUTPATIENT)
Dept: MAMMOGRAPHY | Facility: HOSPITAL | Age: 68
Discharge: HOME | End: 2023-08-03
Payer: MEDICARE

## 2023-08-03 DIAGNOSIS — R92.1 MAMMOGRAPHIC CALCIFICATION FOUND ON DIAGNOSTIC IMAGING OF BREAST: ICD-10-CM

## 2023-08-03 PROCEDURE — 77065 DX MAMMO INCL CAD UNI: CPT

## 2023-08-08 ENCOUNTER — APPOINTMENT (OUTPATIENT)
Dept: LAB | Facility: CLINIC | Age: 68
End: 2023-08-08
Payer: MEDICARE

## 2023-08-08 DIAGNOSIS — R73.01 IMPAIRED FASTING GLUCOSE: ICD-10-CM

## 2023-08-08 DIAGNOSIS — Z11.59 NEED FOR HEPATITIS C SCREENING TEST: ICD-10-CM

## 2023-08-08 DIAGNOSIS — R79.9 ABNORMAL BLOOD CHEMISTRY: ICD-10-CM

## 2023-08-08 LAB
ANION GAP SERPL CALCULATED.3IONS-SCNC: 6 MMOL/L
BUN SERPL-MCNC: 17 MG/DL (ref 5–25)
CALCIUM SERPL-MCNC: 9 MG/DL (ref 8.3–10.1)
CHLORIDE SERPL-SCNC: 110 MMOL/L (ref 96–108)
CO2 SERPL-SCNC: 27 MMOL/L (ref 21–32)
CREAT SERPL-MCNC: 0.96 MG/DL (ref 0.6–1.3)
EST. AVERAGE GLUCOSE BLD GHB EST-MCNC: 123 MG/DL
GFR SERPL CREATININE-BSD FRML MDRD: 61 ML/MIN/1.73SQ M
GLUCOSE SERPL-MCNC: 102 MG/DL (ref 65–140)
HBA1C MFR BLD: 5.9 %
POTASSIUM SERPL-SCNC: 3.6 MMOL/L (ref 3.5–5.3)
SODIUM SERPL-SCNC: 143 MMOL/L (ref 135–147)

## 2023-08-08 PROCEDURE — 86803 HEPATITIS C AB TEST: CPT

## 2023-08-08 PROCEDURE — 36415 COLL VENOUS BLD VENIPUNCTURE: CPT

## 2023-08-08 PROCEDURE — 80048 BASIC METABOLIC PNL TOTAL CA: CPT

## 2023-08-08 PROCEDURE — 83036 HEMOGLOBIN GLYCOSYLATED A1C: CPT

## 2023-08-09 LAB — HCV AB SER QL: NORMAL

## 2024-01-24 ENCOUNTER — APPOINTMENT (OUTPATIENT)
Dept: LAB | Facility: CLINIC | Age: 69
End: 2024-01-24
Payer: MEDICARE

## 2024-01-24 DIAGNOSIS — E78.5 HYPERLIPIDEMIA, UNSPECIFIED HYPERLIPIDEMIA TYPE: ICD-10-CM

## 2024-01-24 LAB
ALBUMIN SERPL BCP-MCNC: 4 G/DL (ref 3.5–5)
ALP SERPL-CCNC: 65 U/L (ref 34–104)
ALT SERPL W P-5'-P-CCNC: 15 U/L (ref 7–52)
ANION GAP SERPL CALCULATED.3IONS-SCNC: 5 MMOL/L
AST SERPL W P-5'-P-CCNC: 15 U/L (ref 13–39)
BILIRUB SERPL-MCNC: 0.63 MG/DL (ref 0.2–1)
BUN SERPL-MCNC: 19 MG/DL (ref 5–25)
CALCIUM SERPL-MCNC: 9 MG/DL (ref 8.4–10.2)
CHLORIDE SERPL-SCNC: 108 MMOL/L (ref 96–108)
CO2 SERPL-SCNC: 29 MMOL/L (ref 21–32)
CREAT SERPL-MCNC: 0.9 MG/DL (ref 0.6–1.3)
GFR SERPL CREATININE-BSD FRML MDRD: 66 ML/MIN/1.73SQ M
GLUCOSE P FAST SERPL-MCNC: 107 MG/DL (ref 65–99)
LDLC SERPL DIRECT ASSAY-MCNC: 126 MG/DL (ref 0–100)
POTASSIUM SERPL-SCNC: 4.1 MMOL/L (ref 3.5–5.3)
PROT SERPL-MCNC: 7.2 G/DL (ref 6.4–8.4)
SODIUM SERPL-SCNC: 142 MMOL/L (ref 135–147)

## 2024-01-24 PROCEDURE — 80053 COMPREHEN METABOLIC PANEL: CPT

## 2024-01-24 PROCEDURE — 83721 ASSAY OF BLOOD LIPOPROTEIN: CPT

## 2024-01-24 PROCEDURE — 36415 COLL VENOUS BLD VENIPUNCTURE: CPT

## 2024-04-18 ENCOUNTER — HOSPITAL ENCOUNTER (OUTPATIENT)
Dept: MAMMOGRAPHY | Facility: HOSPITAL | Age: 69
Discharge: HOME | End: 2024-04-18
Payer: MEDICARE

## 2024-04-18 VITALS — HEIGHT: 65 IN | WEIGHT: 174 LBS | BODY MASS INDEX: 28.99 KG/M2

## 2024-04-18 DIAGNOSIS — R92.8 ABNORMAL MAMMOGRAM: ICD-10-CM

## 2024-04-18 PROCEDURE — 77066 DX MAMMO INCL CAD BI: CPT

## 2024-04-18 PROCEDURE — G0279 TOMOSYNTHESIS, MAMMO: HCPCS

## 2024-07-22 ENCOUNTER — APPOINTMENT (OUTPATIENT)
Dept: LAB | Facility: CLINIC | Age: 69
End: 2024-07-22
Payer: MEDICARE

## 2024-07-22 DIAGNOSIS — R73.01 IMPAIRED FASTING GLUCOSE: ICD-10-CM

## 2024-07-22 DIAGNOSIS — E78.5 HYPERLIPIDEMIA, UNSPECIFIED HYPERLIPIDEMIA TYPE: ICD-10-CM

## 2024-07-22 LAB
ALBUMIN SERPL BCG-MCNC: 4 G/DL (ref 3.5–5)
ALP SERPL-CCNC: 57 U/L (ref 34–104)
ALT SERPL W P-5'-P-CCNC: 20 U/L (ref 7–52)
ANION GAP SERPL CALCULATED.3IONS-SCNC: 11 MMOL/L (ref 4–13)
AST SERPL W P-5'-P-CCNC: 22 U/L (ref 13–39)
BACTERIA UR QL AUTO: ABNORMAL /HPF
BILIRUB SERPL-MCNC: 0.67 MG/DL (ref 0.2–1)
BILIRUB UR QL STRIP: NEGATIVE
BUN SERPL-MCNC: 19 MG/DL (ref 5–25)
CALCIUM SERPL-MCNC: 9 MG/DL (ref 8.4–10.2)
CHLORIDE SERPL-SCNC: 105 MMOL/L (ref 96–108)
CHOLEST SERPL-MCNC: 179 MG/DL
CLARITY UR: CLEAR
CO2 SERPL-SCNC: 26 MMOL/L (ref 21–32)
COLOR UR: ABNORMAL
CREAT SERPL-MCNC: 0.83 MG/DL (ref 0.6–1.3)
ERYTHROCYTE [DISTWIDTH] IN BLOOD BY AUTOMATED COUNT: 13.4 % (ref 11.6–15.1)
EST. AVERAGE GLUCOSE BLD GHB EST-MCNC: 131 MG/DL
GFR SERPL CREATININE-BSD FRML MDRD: 72 ML/MIN/1.73SQ M
GLUCOSE P FAST SERPL-MCNC: 91 MG/DL (ref 65–99)
GLUCOSE UR STRIP-MCNC: NEGATIVE MG/DL
HBA1C MFR BLD: 6.2 %
HCT VFR BLD AUTO: 42.2 % (ref 34.8–46.1)
HDLC SERPL-MCNC: 52 MG/DL
HGB BLD-MCNC: 13.7 G/DL (ref 11.5–15.4)
HGB UR QL STRIP.AUTO: NEGATIVE
KETONES UR STRIP-MCNC: NEGATIVE MG/DL
LDLC SERPL CALC-MCNC: 107 MG/DL (ref 0–100)
LEUKOCYTE ESTERASE UR QL STRIP: ABNORMAL
MCH RBC QN AUTO: 29.6 PG (ref 26.8–34.3)
MCHC RBC AUTO-ENTMCNC: 32.5 G/DL (ref 31.4–37.4)
MCV RBC AUTO: 91 FL (ref 82–98)
MUCOUS THREADS UR QL AUTO: ABNORMAL
NITRITE UR QL STRIP: NEGATIVE
NON-SQ EPI CELLS URNS QL MICRO: ABNORMAL /HPF
NONHDLC SERPL-MCNC: 127 MG/DL
PH UR STRIP.AUTO: 6.5 [PH]
PLATELET # BLD AUTO: 219 THOUSANDS/UL (ref 149–390)
PMV BLD AUTO: 11.5 FL (ref 8.9–12.7)
POTASSIUM SERPL-SCNC: 4 MMOL/L (ref 3.5–5.3)
PROT SERPL-MCNC: 7.2 G/DL (ref 6.4–8.4)
PROT UR STRIP-MCNC: NEGATIVE MG/DL
RBC # BLD AUTO: 4.63 MILLION/UL (ref 3.81–5.12)
RBC #/AREA URNS AUTO: ABNORMAL /HPF
SODIUM SERPL-SCNC: 142 MMOL/L (ref 135–147)
SP GR UR STRIP.AUTO: 1.01 (ref 1–1.03)
TRIGL SERPL-MCNC: 101 MG/DL
UROBILINOGEN UR STRIP-ACNC: <2 MG/DL
WBC # BLD AUTO: 6.86 THOUSAND/UL (ref 4.31–10.16)
WBC #/AREA URNS AUTO: ABNORMAL /HPF

## 2024-07-22 PROCEDURE — 80061 LIPID PANEL: CPT

## 2024-07-22 PROCEDURE — 85027 COMPLETE CBC AUTOMATED: CPT

## 2024-07-22 PROCEDURE — 81001 URINALYSIS AUTO W/SCOPE: CPT

## 2024-07-22 PROCEDURE — 83036 HEMOGLOBIN GLYCOSYLATED A1C: CPT

## 2024-07-22 PROCEDURE — 80053 COMPREHEN METABOLIC PANEL: CPT

## 2024-07-22 PROCEDURE — 36415 COLL VENOUS BLD VENIPUNCTURE: CPT

## 2025-02-07 ENCOUNTER — APPOINTMENT (OUTPATIENT)
Dept: LAB | Facility: CLINIC | Age: 70
End: 2025-02-07
Payer: MEDICARE

## 2025-02-07 DIAGNOSIS — R73.01 IMPAIRED FASTING GLUCOSE: ICD-10-CM

## 2025-02-07 DIAGNOSIS — E78.5 HYPERLIPIDEMIA, UNSPECIFIED HYPERLIPIDEMIA TYPE: ICD-10-CM

## 2025-02-07 LAB
ALBUMIN SERPL BCG-MCNC: 4.3 G/DL (ref 3.5–5)
ALP SERPL-CCNC: 64 U/L (ref 34–104)
ALT SERPL W P-5'-P-CCNC: 15 U/L (ref 7–52)
ANION GAP SERPL CALCULATED.3IONS-SCNC: 7 MMOL/L (ref 4–13)
AST SERPL W P-5'-P-CCNC: 19 U/L (ref 13–39)
BILIRUB SERPL-MCNC: 0.65 MG/DL (ref 0.2–1)
BUN SERPL-MCNC: 16 MG/DL (ref 5–25)
CALCIUM SERPL-MCNC: 9.2 MG/DL (ref 8.4–10.2)
CHLORIDE SERPL-SCNC: 105 MMOL/L (ref 96–108)
CO2 SERPL-SCNC: 30 MMOL/L (ref 21–32)
CREAT SERPL-MCNC: 0.85 MG/DL (ref 0.6–1.3)
ERYTHROCYTE [DISTWIDTH] IN BLOOD BY AUTOMATED COUNT: 12.8 % (ref 11.6–15.1)
EST. AVERAGE GLUCOSE BLD GHB EST-MCNC: 117 MG/DL
GFR SERPL CREATININE-BSD FRML MDRD: 70 ML/MIN/1.73SQ M
GLUCOSE P FAST SERPL-MCNC: 104 MG/DL (ref 65–99)
HBA1C MFR BLD: 5.7 %
HCT VFR BLD AUTO: 43.5 % (ref 34.8–46.1)
HGB BLD-MCNC: 14.1 G/DL (ref 11.5–15.4)
LDLC SERPL DIRECT ASSAY-MCNC: 118 MG/DL (ref 0–100)
MCH RBC QN AUTO: 29.1 PG (ref 26.8–34.3)
MCHC RBC AUTO-ENTMCNC: 32.4 G/DL (ref 31.4–37.4)
MCV RBC AUTO: 90 FL (ref 82–98)
PLATELET # BLD AUTO: 218 THOUSANDS/UL (ref 149–390)
PMV BLD AUTO: 10.7 FL (ref 8.9–12.7)
POTASSIUM SERPL-SCNC: 4.2 MMOL/L (ref 3.5–5.3)
PROT SERPL-MCNC: 7.3 G/DL (ref 6.4–8.4)
RBC # BLD AUTO: 4.85 MILLION/UL (ref 3.81–5.12)
SODIUM SERPL-SCNC: 142 MMOL/L (ref 135–147)
WBC # BLD AUTO: 8.36 THOUSAND/UL (ref 4.31–10.16)

## 2025-02-07 PROCEDURE — 85027 COMPLETE CBC AUTOMATED: CPT

## 2025-02-07 PROCEDURE — 80053 COMPREHEN METABOLIC PANEL: CPT

## 2025-02-07 PROCEDURE — 83721 ASSAY OF BLOOD LIPOPROTEIN: CPT

## 2025-02-07 PROCEDURE — 83036 HEMOGLOBIN GLYCOSYLATED A1C: CPT

## 2025-02-07 PROCEDURE — 36415 COLL VENOUS BLD VENIPUNCTURE: CPT

## 2025-07-24 ENCOUNTER — HOSPITAL ENCOUNTER (OUTPATIENT)
Dept: MAMMOGRAPHY | Facility: HOSPITAL | Age: 70
End: 2025-07-24
Attending: NURSE PRACTITIONER
Payer: MEDICARE

## 2025-07-24 DIAGNOSIS — Z12.31 ENCOUNTER FOR SCREENING MAMMOGRAM FOR MALIGNANT NEOPLASM OF BREAST: ICD-10-CM

## 2025-07-24 PROCEDURE — 77063 BREAST TOMOSYNTHESIS BI: CPT

## 2025-07-24 PROCEDURE — 77067 SCR MAMMO BI INCL CAD: CPT

## 2025-08-12 ENCOUNTER — APPOINTMENT (OUTPATIENT)
Dept: LAB | Facility: CLINIC | Age: 70
End: 2025-08-12
Attending: NURSE PRACTITIONER
Payer: MEDICARE

## (undated) DEVICE — AIRLIFE™ TRI-FLO™ SUCTION CATHETER WITH CONTROL PORT: Brand: AIRLIFE™

## (undated) DEVICE — BOVIE SUCTION 8 IN

## (undated) DEVICE — SPONGE TONSIL STRUNG 7/8 IN X-RAY DETECT

## (undated) DEVICE — 2000CC GUARDIAN II: Brand: GUARDIAN

## (undated) DEVICE — STERILE T AND A PACK: Brand: CARDINAL HEALTH

## (undated) DEVICE — GLOVE SRG BIOGEL ORTHOPEDIC 7

## (undated) DEVICE — NEEDLE HYPO 22G X 1-1/2 IN

## (undated) DEVICE — ANTI-FOG SOLUTION WITH FOAM PAD: Brand: DEVON

## (undated) DEVICE — MEDI-VAC YANKAUER SUCTION HANDLE W/BULBOUS AND CONTROL VENT: Brand: CARDINAL HEALTH

## (undated) DEVICE — SCD SEQUENTIAL COMPRESSION COMFORT SLEEVE MEDIUM KNEE LENGTH: Brand: KENDALL SCD

## (undated) DEVICE — Device